# Patient Record
(demographics unavailable — no encounter records)

---

## 2024-10-10 NOTE — HISTORY OF PRESENT ILLNESS
[Lower back] : lower back [6] : 6 [4] : 4 [Shooting] : shooting [Stabbing] : stabbing [Intermittent] : intermittent [Household chores] : household chores [Leisure] : leisure [Sleep] : sleep [Rest] : rest [Sitting] : sitting [FreeTextEntry1] : The patient presents for initial evaluation regarding his/her low back pain.  Patient was referred by Dr. LEWIS Harris.  She reports ongoing low back pain with radiation to the right upper buttocks which began approximately 6-7 months ago.  She denies any particular inciting or exacerbating event.  Symptoms have been progressive and limiting her ADLs and social activity.  She has participated in chiropractic and acupuncture without any benefit.  She also has been utilizing OTC NSAIDs as well as meloxicam without relief.  Subjective weakness: No Lower extremity paresthesias: No Bladder/bowel dysfunction: No   Injections: No   Pertinent Surgical History: N/A   Imaging: MRI Lumbar Spine (9/6/2024) - [facility]   Physician Disclaimer: I have personally reviewed and confirmed all HPI data with the patient. [] : no [de-identified] : PROLONG  [de-identified] : L MRI AT [facility] ([place])

## 2024-10-10 NOTE — ASSESSMENT
[FreeTextEntry1] : We discussed the nature of the underlying pathology and available pain management treatment options. These included interventional, rehabilitative, pharmacological, and complementary modalities. We will proceed with the following:    Interventional treatment options: - Proceed with right PM L4-L5 LESI with fluoroscopic guidance - Can consider facet direct intervention for ongoing axial low back pain; not discussed - see additional instructions below    Rehabilitative options: - Initiate trial of physical therapy - Participation in active HEP was discussed and encouraged as tolerated   Medication based treatment options: - Initiate trial of Celebrex 200 mg daily x 5-7 days then on as needed basis - See additional instructions below    Complementary treatment options: - Weight management and lifestyle modifications discussed - Failed previous chiropractic and acupuncture trials   Additional treatment recommendations as follows: - Follow up 1-2 weeks post injection for assessment of efficacy and further treatment recommendations  The risks, benefits and alternatives of the proposed procedure were explained in detail with the patient. The risks outlined include but are not limited to infection, bleeding, post- dural puncture headache, nerve injury, a temporary increase in pain, failure to resolve symptoms, need for future interventions, allergic reaction, and possible elevation of blood sugar in diabetics if using corticosteroid.  All questions were answered to patient's apparent satisfaction, and he/she verbalized an understanding.  We have discussed the risks, benefits, and alternatives for NSAID therapy including but not limited to the risk of bleeding, thrombosis, gastric mucosal irritation/ulceration, allergic reaction and kidney dysfunction.  The patient was counseled to utilize NSAIDs concurrently with food and/or a PPI if applicable.  They were counseled to use the lowest effective dose for the shortest duration. The patient verbalizes an understanding.

## 2024-10-10 NOTE — REASON FOR VISIT
[Initial Consultation] : an initial pain management consultation [FreeTextEntry2] : Low back/right buttock pain

## 2024-10-10 NOTE — PHYSICAL EXAM
[de-identified] : Constitutional: - No acute distress - Well developed; well nourished   Neurological: - normal mood and affect - alert and oriented x 3   Cardiovascular: - grossly normal  Lumbar Spine Exam:   Inspection: erythema (-) ecchymosis (-) rashes (-) alignment: no scoliosis   Palpation: Midline lumbar tenderness:            (-) midline thoracic tenderness:          (-) Lumbar paraspinal tenderness:      L (-); R (-) thoracic paraspinal tenderness:     L (-); R (-) sciatic nerve tenderness :              L (-); R (-) SI joint tenderness:                        L (-); R (-) GTB tenderness:                            L (-); R (-)   ROM:  WNL; stiffness throughout ROM testing Pain with extremes of flexion/extension   Strength:                                    Right       Left  Hip Flexion:                (5/5)       (5/5) Quadriceps:               (5/5)       (5/5) Hamstrings:                (5/5)       (5/5) Ankle Dorsiflexion:     (5/5)       (5/5) EHL:                           (5/5)       (5/5) Ankle Plantarflexion:  (5/5)       (5/5)   Special Tests: SLR:                           R (+) ; L (-) Facet loading:            R (+) ; L (-) CICI test:               R (n/a) ; L (n/a) Hamstring tightness:  R (+);  L (+)   Neurologic: SI LT throughout right lower extremity SI LT throughout left lower extremity   Reflexes normal and symmetric bilateral lower extremities   Gait: non- antalgic gait ambulates without assistive device